# Patient Record
Sex: MALE | Race: OTHER | Employment: UNEMPLOYED | URBAN - METROPOLITAN AREA
[De-identification: names, ages, dates, MRNs, and addresses within clinical notes are randomized per-mention and may not be internally consistent; named-entity substitution may affect disease eponyms.]

---

## 2018-07-17 ENCOUNTER — OFFICE VISIT (OUTPATIENT)
Dept: INTERNAL MEDICINE CLINIC | Facility: CLINIC | Age: 48
End: 2018-07-17

## 2018-07-17 VITALS
WEIGHT: 170 LBS | HEIGHT: 65 IN | HEART RATE: 69 BPM | SYSTOLIC BLOOD PRESSURE: 132 MMHG | BODY MASS INDEX: 28.32 KG/M2 | TEMPERATURE: 98 F | DIASTOLIC BLOOD PRESSURE: 76 MMHG

## 2018-07-17 DIAGNOSIS — Z00.00 ROUTINE PHYSICAL EXAMINATION: Primary | ICD-10-CM

## 2018-07-17 DIAGNOSIS — Z12.5 PROSTATE CANCER SCREENING: ICD-10-CM

## 2018-07-17 PROCEDURE — 99396 PREV VISIT EST AGE 40-64: CPT | Performed by: INTERNAL MEDICINE

## 2018-07-17 NOTE — PROGRESS NOTES
HPI:    Patient ID: Shade Mckoy is a 50year old male. New patient, [facility]. Has been in the [place] for the past 15 months. Works as a . -3 children. Not a smoker. ,  Does not drink.   Patient does not have ASSESSMENT/PLAN:     Problem List Items Addressed This Visit        Unprioritized    Routine physical examination - Primary     Normal exam.  Labs as ordered. Skin check–normal  No cervical, axillary, inguinal lymphadenopathy. Hernial orifices intact.

## 2018-07-17 NOTE — ASSESSMENT & PLAN NOTE
Normal exam.  Labs as ordered. Skin check–normal  No cervical, axillary, inguinal lymphadenopathy. Hernial orifices intact. Immunizations–. Patient has not had any immunizations recently.   Tdap discussed–patient is advised to take this at the local p

## 2019-03-25 ENCOUNTER — WALK IN (OUTPATIENT)
Dept: URGENT CARE | Age: 49
End: 2019-03-25

## 2019-03-25 DIAGNOSIS — Z11.1 SCREENING-PULMONARY TB: Primary | ICD-10-CM

## 2019-03-25 PROCEDURE — 86580 TB INTRADERMAL TEST: CPT | Performed by: NURSE PRACTITIONER

## 2019-03-27 ENCOUNTER — WALK IN (OUTPATIENT)
Dept: URGENT CARE | Age: 49
End: 2019-03-27

## 2019-03-27 DIAGNOSIS — Z11.1 ENCOUNTER FOR PPD SKIN TEST READING: Primary | ICD-10-CM

## 2019-03-27 LAB
INDURATION: 4 MM (ref 0–?)
SKIN TEST RESULT: NEGATIVE

## 2019-03-27 PROCEDURE — X1094 NO CHARGE VISIT: HCPCS | Performed by: NURSE PRACTITIONER

## 2020-12-02 ENCOUNTER — OFFICE VISIT (OUTPATIENT)
Dept: INTERNAL MEDICINE CLINIC | Facility: CLINIC | Age: 50
End: 2020-12-02
Payer: COMMERCIAL

## 2020-12-02 VITALS
RESPIRATION RATE: 22 BRPM | TEMPERATURE: 97 F | BODY MASS INDEX: 30.96 KG/M2 | DIASTOLIC BLOOD PRESSURE: 106 MMHG | OXYGEN SATURATION: 99 % | HEIGHT: 63.75 IN | HEART RATE: 114 BPM | WEIGHT: 179.13 LBS | SYSTOLIC BLOOD PRESSURE: 176 MMHG

## 2020-12-02 DIAGNOSIS — Z00.00 WELLNESS EXAMINATION: ICD-10-CM

## 2020-12-02 DIAGNOSIS — Z12.11 SCREENING FOR COLON CANCER: ICD-10-CM

## 2020-12-02 DIAGNOSIS — Z23 NEED FOR VACCINATION: ICD-10-CM

## 2020-12-02 DIAGNOSIS — Z00.00 ROUTINE PHYSICAL EXAMINATION: Primary | ICD-10-CM

## 2020-12-02 PROCEDURE — 90686 IIV4 VACC NO PRSV 0.5 ML IM: CPT | Performed by: INTERNAL MEDICINE

## 2020-12-02 PROCEDURE — 90715 TDAP VACCINE 7 YRS/> IM: CPT | Performed by: INTERNAL MEDICINE

## 2020-12-02 PROCEDURE — 90471 IMMUNIZATION ADMIN: CPT | Performed by: INTERNAL MEDICINE

## 2020-12-02 PROCEDURE — 90472 IMMUNIZATION ADMIN EACH ADD: CPT | Performed by: INTERNAL MEDICINE

## 2020-12-02 PROCEDURE — 99396 PREV VISIT EST AGE 40-64: CPT | Performed by: INTERNAL MEDICINE

## 2020-12-02 PROCEDURE — 3080F DIAST BP >= 90 MM HG: CPT | Performed by: INTERNAL MEDICINE

## 2020-12-02 PROCEDURE — 3008F BODY MASS INDEX DOCD: CPT | Performed by: INTERNAL MEDICINE

## 2020-12-02 PROCEDURE — 3077F SYST BP >= 140 MM HG: CPT | Performed by: INTERNAL MEDICINE

## 2020-12-02 NOTE — PATIENT INSTRUCTIONS
Problem List Items Addressed This Visit        Unprioritized    Routine physical examination - Primary     Normal exam.  Labs as ordered. Skin check–normal  No cervical, axillary, inguinal lymphadenopathy. Hernial orifices intact.   Rectal exam normal,pro

## 2020-12-02 NOTE — PROGRESS NOTES
HPI:   Crystal Hurd is a 48year old male who presents for an Annual Health Visit. Returned from [place] 3 months back,was there for 2 years after last visit. all labs ordered incomplete    Due for colonoscopy. Due for flu shot.   fam hx n Psychiatric/Behavioral: Negative.           EXAM:   BP (!) 176/106 (BP Location: Left arm, Patient Position: Sitting, Cuff Size: large)   Pulse 114   Temp 96.6 °F (35.9 °C) (Tympanic)   Resp 22   Ht 5' 3.75\" (1.619 m)   Wt 179 lb 1.6 oz (81.2 kg)   SpO2 cervical adenopathy. Right: No inguinal adenopathy present. Left: No inguinal adenopathy present. Neurological: He is alert and oriented to person, place, and time. He has normal reflexes. No cranial nerve deficit.  He exhibits normal muscle CARD TREADMILL STRESS, ADULT (CPT=93017)    SARS-COV-2 BY PCR ()      Other Visit Diagnoses     Wellness examination        Screening for colon cancer        Relevant Orders    GASTRO - INTERNAL    Need for vaccination        Relevant Orders    TET

## 2020-12-02 NOTE — ASSESSMENT & PLAN NOTE
Normal exam.  Labs as ordered. Skin check–normal  No cervical, axillary, inguinal lymphadenopathy. Hernial orifices intact. Rectal exam normal,prostate normal to palpation. Small hemorrhoids present.  guaic negetive brown stools. Immunizations–.   Im

## 2020-12-14 ENCOUNTER — LAB ENCOUNTER (OUTPATIENT)
Dept: LAB | Facility: HOSPITAL | Age: 50
End: 2020-12-14
Attending: INTERNAL MEDICINE
Payer: COMMERCIAL

## 2020-12-14 DIAGNOSIS — Z00.00 ROUTINE PHYSICAL EXAMINATION: ICD-10-CM

## 2020-12-14 PROCEDURE — 85025 COMPLETE CBC W/AUTO DIFF WBC: CPT

## 2020-12-14 PROCEDURE — 36415 COLL VENOUS BLD VENIPUNCTURE: CPT

## 2020-12-14 PROCEDURE — 80053 COMPREHEN METABOLIC PANEL: CPT

## 2020-12-14 PROCEDURE — 81001 URINALYSIS AUTO W/SCOPE: CPT

## 2020-12-14 PROCEDURE — 82607 VITAMIN B-12: CPT

## 2020-12-14 PROCEDURE — 80061 LIPID PANEL: CPT

## 2020-12-14 PROCEDURE — 82306 VITAMIN D 25 HYDROXY: CPT

## 2020-12-14 PROCEDURE — 84443 ASSAY THYROID STIM HORMONE: CPT

## 2020-12-15 ENCOUNTER — TELEPHONE (OUTPATIENT)
Dept: INTERNAL MEDICINE CLINIC | Facility: CLINIC | Age: 50
End: 2020-12-15

## 2020-12-15 RX ORDER — CYANOCOBALAMIN 1000 UG/ML
1000 INJECTION INTRAMUSCULAR; SUBCUTANEOUS WEEKLY
Qty: 12 VIAL | Refills: 0 | Status: SHIPPED | OUTPATIENT
Start: 2020-12-15

## 2020-12-15 RX ORDER — SYRINGE, DISPOSABLE, 3 ML
SYRINGE, EMPTY DISPOSABLE MISCELLANEOUS
Qty: 12 EACH | Refills: 0 | Status: SHIPPED | OUTPATIENT
Start: 2020-12-15

## 2020-12-15 NOTE — TELEPHONE ENCOUNTER
Melody Keane pt was called and informed of your message below. Pt will have his wife give him the B12 injections weekly for 12 weeks as she is a nurse. Pt has questions see below and please sign the b12 pending orders for you.     1.For how long should he take

## 2020-12-16 ENCOUNTER — TELEPHONE (OUTPATIENT)
Dept: INTERNAL MEDICINE CLINIC | Facility: CLINIC | Age: 50
End: 2020-12-16

## 2020-12-16 NOTE — TELEPHONE ENCOUNTER
[facility] calling because they received e script for syringes and needles for B-12 injection. Needles were ordered for 14 gauge 1 inch but the biggest they have will be 21 gauge.  Please advise

## 2020-12-16 NOTE — TELEPHONE ENCOUNTER
B12 injections–for 3 months and then recheck levels. N70 and folic acid orally most likely for lifetime.   We will check labs and advise to discontinue when appropriate

## 2020-12-16 NOTE — TELEPHONE ENCOUNTER
[address] [place] [address] pharmacy is wanting a new script for the needles. She stated that they do have a 25g 1 inch 3 ml or 21g 1 inch. Please advise I have pended both for your review please sign the one you would like for pt to get.  Thanks

## 2020-12-19 ENCOUNTER — LAB ENCOUNTER (OUTPATIENT)
Dept: LAB | Facility: HOSPITAL | Age: 50
End: 2020-12-19
Attending: INTERNAL MEDICINE
Payer: COMMERCIAL

## 2020-12-19 DIAGNOSIS — Z00.00 ROUTINE PHYSICAL EXAMINATION: ICD-10-CM

## 2020-12-22 ENCOUNTER — HOSPITAL ENCOUNTER (OUTPATIENT)
Dept: CV DIAGNOSTICS | Facility: HOSPITAL | Age: 50
End: 2020-12-22
Attending: INTERNAL MEDICINE
Payer: COMMERCIAL

## 2020-12-22 ENCOUNTER — HOSPITAL ENCOUNTER (OUTPATIENT)
Dept: CV DIAGNOSTICS | Facility: HOSPITAL | Age: 50
Discharge: HOME | End: 2020-12-22
Attending: INTERNAL MEDICINE
Payer: COMMERCIAL

## 2020-12-22 DIAGNOSIS — Z00.00 ROUTINE PHYSICAL EXAMINATION: ICD-10-CM

## 2020-12-22 PROCEDURE — 93016 CV STRESS TEST SUPVJ ONLY: CPT | Performed by: INTERNAL MEDICINE

## 2020-12-22 PROCEDURE — 93018 CV STRESS TEST I&R ONLY: CPT | Performed by: INTERNAL MEDICINE

## 2020-12-22 PROCEDURE — 93017 CV STRESS TEST TRACING ONLY: CPT | Performed by: INTERNAL MEDICINE

## 2021-02-14 ENCOUNTER — PATIENT MESSAGE (OUTPATIENT)
Dept: INTERNAL MEDICINE CLINIC | Facility: CLINIC | Age: 51
End: 2021-02-14

## 2021-02-15 ENCOUNTER — NURSE TRIAGE (OUTPATIENT)
Dept: INTERNAL MEDICINE CLINIC | Facility: CLINIC | Age: 51
End: 2021-02-15

## 2021-02-15 ENCOUNTER — OFFICE VISIT (OUTPATIENT)
Dept: INTERNAL MEDICINE CLINIC | Facility: CLINIC | Age: 51
End: 2021-02-15
Payer: COMMERCIAL

## 2021-02-15 VITALS
DIASTOLIC BLOOD PRESSURE: 82 MMHG | BODY MASS INDEX: 31.94 KG/M2 | RESPIRATION RATE: 18 BRPM | HEIGHT: 63.75 IN | WEIGHT: 184.81 LBS | SYSTOLIC BLOOD PRESSURE: 142 MMHG | HEART RATE: 82 BPM

## 2021-02-15 DIAGNOSIS — R22.0 FACIAL SWELLING: Primary | ICD-10-CM

## 2021-02-15 PROCEDURE — 3077F SYST BP >= 140 MM HG: CPT | Performed by: NURSE PRACTITIONER

## 2021-02-15 PROCEDURE — 99214 OFFICE O/P EST MOD 30 MIN: CPT | Performed by: NURSE PRACTITIONER

## 2021-02-15 PROCEDURE — 3008F BODY MASS INDEX DOCD: CPT | Performed by: NURSE PRACTITIONER

## 2021-02-15 PROCEDURE — 3079F DIAST BP 80-89 MM HG: CPT | Performed by: NURSE PRACTITIONER

## 2021-02-15 NOTE — PROGRESS NOTES
HPI:    Patient ID: Jorge Fam is a 48year old male. Pain (pain below ear on right side of face, swelling, and painful when chewing, )    HPI Right sided facial swelling. Very large tender lymph node. This started Friday evening.   The pain Neurological: Negative for weakness, numbness and headaches. Hematological: Does not bruise/bleed easily. Psychiatric/Behavioral: The patient is not nervous/anxious.              Current Outpatient Medications   Medication Sig Dispense Refill   • Need Readings from Last 2 Encounters:  02/15/21 : 184 lb 12.8 oz (83.8 kg)  12/02/20 : 179 lb 1.6 oz (81.2 kg)    Body mass index is 31.97 kg/m². (2)           ASSESSMENT/PLAN:     Problem List Items Addressed This Visit        Unprioritized    Facial swelling -

## 2021-02-15 NOTE — TELEPHONE ENCOUNTER
Action Requested: Summary for Provider     []  Critical Lab, Recommendations Needed  [] Need Additional Advice  []   FYI    []   Need Orders  [] Need Medications Sent to Pharmacy  []  Other     SUMMARY: appt today with ALVAREZ Chicas    Reason for call:

## 2021-02-15 NOTE — ASSESSMENT & PLAN NOTE
A/P 49-year-old [place] ([facility]) gentleman who stated he started with right-sided facial swelling on Friday evening. Large tender lymph nodes noted. Mass-like swelling.   Patient denies any fever chills, fatigue but he is slightly tachycardic with a heart rate of 1

## 2021-02-15 NOTE — TELEPHONE ENCOUNTER
Message routed to the triage pool for RN follow-up.       ----- Message from Alcides Giordano sent at 2/14/2021  3:52 PM CST -----  Regarding: Non-Urgent Medical Question  Contact: [phone]  Hi doctor How are you?   Since day before yesterday, I have a sw

## 2021-02-15 NOTE — TELEPHONE ENCOUNTER
From: Galileo Denis  To: Brant Jimenez MD  Sent: 2/14/2021 3:52 PM CST  Subject: Non-Urgent Medical Question    Hi doctor How are you? Since day before yesterday, I have a swelling just underneath of right ear( at the end of lower jaw. ) If I pr

## 2021-02-16 ENCOUNTER — HOSPITAL ENCOUNTER (EMERGENCY)
Facility: HOSPITAL | Age: 51
Discharge: HOME | End: 2021-02-16
Attending: EMERGENCY MEDICINE
Payer: COMMERCIAL

## 2021-02-16 ENCOUNTER — APPOINTMENT (OUTPATIENT)
Dept: CT IMAGING | Facility: HOSPITAL | Age: 51
End: 2021-02-16
Attending: EMERGENCY MEDICINE
Payer: COMMERCIAL

## 2021-02-16 VITALS
TEMPERATURE: 99 F | RESPIRATION RATE: 16 BRPM | SYSTOLIC BLOOD PRESSURE: 142 MMHG | DIASTOLIC BLOOD PRESSURE: 90 MMHG | HEART RATE: 102 BPM | OXYGEN SATURATION: 98 %

## 2021-02-16 DIAGNOSIS — K11.20 PAROTIDITIS: Primary | ICD-10-CM

## 2021-02-16 PROCEDURE — 70491 CT SOFT TISSUE NECK W/DYE: CPT | Performed by: EMERGENCY MEDICINE

## 2021-02-16 PROCEDURE — 99284 EMERGENCY DEPT VISIT MOD MDM: CPT

## 2021-02-16 RX ORDER — CLINDAMYCIN HYDROCHLORIDE 300 MG/1
300 CAPSULE ORAL 3 TIMES DAILY
Qty: 30 CAPSULE | Refills: 0 | Status: SHIPPED | OUTPATIENT
Start: 2021-02-16 | End: 2021-02-26

## 2021-02-16 NOTE — ED PROVIDER NOTES
Patient Seen in: [facility] AND [facility] Emergency Department      History   Patient presents with:  Abscess    Stated Complaint: bump to face    HPI/Subjective:   HPI  Patient is a healthy 59-year-old male presenting with right-sided cheek swelling.   Reports Normal dentition, no trismus, no Ludwigs. Eyes:      Extraocular Movements: Extraocular movements intact. Conjunctiva/sclera: Conjunctivae normal.      Pupils: Pupils are equal, round, and reactive to light.    Neck:      Musculoskeletal: Normal range PM     Finalized by (CST): Haleigh Koch MD on 2/16/2021 at 12:40 PM                   MDM      Patient is a healthy 51-year-old male presenting with right-sided cheek swelling x5 days. Arrived in no acute distress.   Vitals with mild tachycardia ot

## 2021-02-16 NOTE — ED NOTES
rec'd pt from triage. C/o right jaw swelling and pain x few days. Denies dental pain. Denies fevers. Clear speech. Managing own secretions. No resp distress. Sitting in chair in room, awaiting md evaluation. will continue to monitor.

## 2021-03-23 NOTE — PATIENT INSTRUCTIONS
Problem List Items Addressed This Visit        Unprioritized    Routine physical examination - Primary     Normal exam.  Labs as ordered. Skin check–normal  No cervical, axillary, inguinal lymphadenopathy. Hernial orifices intact. Immunizations–.   Peter Sommer Patient expressed no known problems or needs

## 2021-05-26 RX ORDER — ERGOCALCIFEROL 1.25 MG/1
50000 CAPSULE ORAL WEEKLY
Qty: 12 CAPSULE | Refills: 1 | Status: SHIPPED | OUTPATIENT
Start: 2021-05-26 | End: 2021-06-25

## 2021-12-31 ENCOUNTER — TELEPHONE (OUTPATIENT)
Dept: INTERNAL MEDICINE CLINIC | Facility: CLINIC | Age: 51
End: 2021-12-31

## 2022-01-11 NOTE — TELEPHONE ENCOUNTER
Message # [phone]         2021 01:55p   [[email or web address]]  To:  From:  J LUIS Putnam MD:  Phone#:  ----------------------------------------------------------------------  Yulissa Melgar [phone] RE; [id number]  04/10/1970 Paged at number :  PAGE: [phone]

## 2023-08-09 ENCOUNTER — ORDER TRANSCRIPTION (OUTPATIENT)
Dept: ADMINISTRATIVE | Facility: HOSPITAL | Age: 53
End: 2023-08-09

## 2023-08-09 DIAGNOSIS — Z13.6 SCREENING FOR CARDIOVASCULAR CONDITION: Primary | ICD-10-CM

## (undated) NOTE — LETTER
08/16/18        [facility]      Dear Marty Nieves,    [id number] Located within [facility] records indicate that you have outstanding lab work and or testing that was ordered for you and has not yet been completed:          CBC W Differential W Platelet